# Patient Record
Sex: FEMALE | Race: ASIAN | NOT HISPANIC OR LATINO
[De-identification: names, ages, dates, MRNs, and addresses within clinical notes are randomized per-mention and may not be internally consistent; named-entity substitution may affect disease eponyms.]

---

## 2024-02-12 PROBLEM — Z00.00 ENCOUNTER FOR PREVENTIVE HEALTH EXAMINATION: Status: ACTIVE | Noted: 2024-02-12

## 2024-03-05 ENCOUNTER — ASOB RESULT (OUTPATIENT)
Age: 29
End: 2024-03-05

## 2024-03-05 ENCOUNTER — APPOINTMENT (OUTPATIENT)
Dept: OBGYN | Facility: CLINIC | Age: 29
End: 2024-03-05
Payer: COMMERCIAL

## 2024-03-05 VITALS
HEIGHT: 64 IN | DIASTOLIC BLOOD PRESSURE: 82 MMHG | HEART RATE: 96 BPM | BODY MASS INDEX: 23.05 KG/M2 | SYSTOLIC BLOOD PRESSURE: 125 MMHG | WEIGHT: 135 LBS

## 2024-03-05 DIAGNOSIS — Z78.9 OTHER SPECIFIED HEALTH STATUS: ICD-10-CM

## 2024-03-05 DIAGNOSIS — O36.80X0 PREGNANCY WITH INCONCLUSIVE FETAL VIABILITY, NOT APPLICABLE OR UNSPECIFIED: ICD-10-CM

## 2024-03-05 PROCEDURE — 76816 OB US FOLLOW-UP PER FETUS: CPT

## 2024-03-05 PROCEDURE — 99203 OFFICE O/P NEW LOW 30 MIN: CPT

## 2024-03-05 RX ORDER — PNV NO.95/FERROUS FUM/FOLIC AC 28MG-0.8MG
TABLET ORAL
Refills: 0 | Status: ACTIVE | COMMUNITY

## 2024-03-06 LAB
ABO + RH PNL BLD: NORMAL
ALBUMIN SERPL ELPH-MCNC: 4.2 G/DL
ALP BLD-CCNC: 107 U/L
ALT SERPL-CCNC: 15 U/L
ANION GAP SERPL CALC-SCNC: 13 MMOL/L
AST SERPL-CCNC: 17 U/L
BACTERIA UR CULT: NORMAL
BILIRUB SERPL-MCNC: 0.3 MG/DL
BLD GP AB SCN SERPL QL: NORMAL
BUN SERPL-MCNC: 8 MG/DL
C TRACH RRNA SPEC QL NAA+PROBE: NOT DETECTED
CALCIUM SERPL-MCNC: 9.2 MG/DL
CHLORIDE SERPL-SCNC: 100 MMOL/L
CO2 SERPL-SCNC: 22 MMOL/L
CREAT SERPL-MCNC: 0.66 MG/DL
EGFR: 122 ML/MIN/1.73M2
ESTIMATED AVERAGE GLUCOSE: 108 MG/DL
GLUCOSE SERPL-MCNC: 79 MG/DL
HBA1C MFR BLD HPLC: 5.4 %
HBV SURFACE AG SER QL: NONREACTIVE
HCT VFR BLD CALC: 31 %
HCV AB SER QL: NONREACTIVE
HCV S/CO RATIO: 0.15 S/CO
HGB A MFR BLD: 97.7 %
HGB A2 MFR BLD: 2.3 %
HGB BLD-MCNC: 10 G/DL
HGB FRACT BLD-IMP: NORMAL
HIV1+2 AB SPEC QL IA.RAPID: NONREACTIVE
MCHC RBC-ENTMCNC: 25.5 PG
MCHC RBC-ENTMCNC: 32.3 GM/DL
MCV RBC AUTO: 79.1 FL
MEV IGG FLD QL IA: 14.2 AU/ML
MEV IGG+IGM SER-IMP: NORMAL
N GONORRHOEA RRNA SPEC QL NAA+PROBE: NOT DETECTED
PLATELET # BLD AUTO: 353 K/UL
POTASSIUM SERPL-SCNC: 4.2 MMOL/L
PROT SERPL-MCNC: 7.5 G/DL
RBC # BLD: 3.92 M/UL
RBC # FLD: 15.4 %
RUBV IGG FLD-ACNC: 1.2 INDEX
RUBV IGG SER-IMP: POSITIVE
SODIUM SERPL-SCNC: 135 MMOL/L
SOURCE AMPLIFICATION: NORMAL
T GONDII AB SER-IMP: NEGATIVE
T GONDII AB SER-IMP: NEGATIVE
T GONDII IGG SER QL: <3 IU/ML
T GONDII IGM SER QL: <3 AU/ML
T PALLIDUM AB SER QL IA: NEGATIVE
TSH SERPL-ACNC: 1.82 UIU/ML
VZV AB TITR SER: NORMAL
VZV IGG SER IF-ACNC: 154.9 INDEX
WBC # FLD AUTO: 9.17 K/UL

## 2024-03-08 LAB
B19V IGG SER QL IA: 0.11 INDEX
B19V IGG+IGM SER-IMP: NEGATIVE
B19V IGG+IGM SER-IMP: NORMAL
B19V IGM FLD-ACNC: 0.14 INDEX
B19V IGM SER-ACNC: NEGATIVE
LEAD BLD-MCNC: <1 UG/DL

## 2024-03-10 LAB
AR GENE MUT ANL BLD/T: NORMAL
M TB IFN-G BLD-IMP: NEGATIVE
QUANTIFERON TB PLUS MITOGEN MINUS NIL: 7.69 IU/ML
QUANTIFERON TB PLUS NIL: 0.04 IU/ML
QUANTIFERON TB PLUS TB1 MINUS NIL: 0 IU/ML
QUANTIFERON TB PLUS TB2 MINUS NIL: 0 IU/ML

## 2024-03-13 ENCOUNTER — TRANSCRIPTION ENCOUNTER (OUTPATIENT)
Age: 29
End: 2024-03-13

## 2024-03-13 ENCOUNTER — ASOB RESULT (OUTPATIENT)
Age: 29
End: 2024-03-13

## 2024-03-13 ENCOUNTER — APPOINTMENT (OUTPATIENT)
Dept: ANTEPARTUM | Facility: CLINIC | Age: 29
End: 2024-03-13
Payer: COMMERCIAL

## 2024-03-13 LAB — FMR1 GENE MUT ANL BLD/T: NORMAL

## 2024-03-13 PROCEDURE — 76813 OB US NUCHAL MEAS 1 GEST: CPT | Mod: 59

## 2024-03-13 PROCEDURE — 76801 OB US < 14 WKS SINGLE FETUS: CPT

## 2024-03-18 LAB — CFTR MUT TESTED BLD/T: NEGATIVE

## 2024-04-02 ENCOUNTER — APPOINTMENT (OUTPATIENT)
Dept: OBGYN | Facility: CLINIC | Age: 29
End: 2024-04-02
Payer: COMMERCIAL

## 2024-04-02 ENCOUNTER — NON-APPOINTMENT (OUTPATIENT)
Age: 29
End: 2024-04-02

## 2024-04-02 VITALS
SYSTOLIC BLOOD PRESSURE: 109 MMHG | DIASTOLIC BLOOD PRESSURE: 64 MMHG | WEIGHT: 138 LBS | BODY MASS INDEX: 23.56 KG/M2 | HEIGHT: 64 IN | HEART RATE: 80 BPM

## 2024-04-02 PROCEDURE — 0502F SUBSEQUENT PRENATAL CARE: CPT

## 2024-04-05 LAB
AFP MOM: 1.02
AFP VALUE: 39.3 NG/ML
ALPHA FETOPROTEIN SERUM COMMENT: NORMAL
ALPHA FETOPROTEIN SERUM INTERPRETATION: NORMAL
ALPHA FETOPROTEIN SERUM RESULTS: NORMAL
ALPHA FETOPROTEIN SERUM TEST RESULTS: NORMAL
GESTATIONAL AGE BASED ON: NORMAL
GESTATIONAL AGE ON COLLECTION DATE: 16.6 WEEKS
INSULIN DEP DIABETES: NO
MATERNAL AGE AT EDD AFP: 30.2 YR
MULTIPLE GESTATION: NO
OSBR RISK 1 IN: NORMAL
RACE: NORMAL
WEIGHT AFP: 138 LBS

## 2024-04-24 ENCOUNTER — APPOINTMENT (OUTPATIENT)
Dept: ANTEPARTUM | Facility: CLINIC | Age: 29
End: 2024-04-24
Payer: COMMERCIAL

## 2024-04-24 ENCOUNTER — APPOINTMENT (OUTPATIENT)
Dept: ANTEPARTUM | Facility: CLINIC | Age: 29
End: 2024-04-24

## 2024-04-24 ENCOUNTER — ASOB RESULT (OUTPATIENT)
Age: 29
End: 2024-04-24

## 2024-04-24 PROCEDURE — 76805 OB US >/= 14 WKS SNGL FETUS: CPT

## 2024-05-03 ENCOUNTER — APPOINTMENT (OUTPATIENT)
Dept: OBGYN | Facility: CLINIC | Age: 29
End: 2024-05-03
Payer: COMMERCIAL

## 2024-05-03 ENCOUNTER — NON-APPOINTMENT (OUTPATIENT)
Age: 29
End: 2024-05-03

## 2024-05-03 DIAGNOSIS — Z34.92 ENCOUNTER FOR SUPERVISION OF NORMAL PREGNANCY, UNSPECIFIED, SECOND TRIMESTER: ICD-10-CM

## 2024-05-03 PROCEDURE — 0502F SUBSEQUENT PRENATAL CARE: CPT

## 2024-05-05 PROBLEM — Z34.92 ENCOUNTER FOR SUPERVISION OF LOW-RISK PREGNANCY IN SECOND TRIMESTER: Status: ACTIVE | Noted: 2024-04-02 | Resolved: 2024-11-28

## 2024-05-31 ENCOUNTER — APPOINTMENT (OUTPATIENT)
Dept: OBGYN | Facility: CLINIC | Age: 29
End: 2024-05-31

## 2024-06-04 ENCOUNTER — APPOINTMENT (OUTPATIENT)
Dept: OBGYN | Facility: CLINIC | Age: 29
End: 2024-06-04
Payer: COMMERCIAL

## 2024-06-04 VITALS
HEIGHT: 64 IN | SYSTOLIC BLOOD PRESSURE: 104 MMHG | WEIGHT: 145 LBS | BODY MASS INDEX: 24.75 KG/M2 | HEART RATE: 91 BPM | DIASTOLIC BLOOD PRESSURE: 68 MMHG

## 2024-06-04 DIAGNOSIS — Z34.82 ENCOUNTER FOR SUPERVISION OF OTHER NORMAL PREGNANCY, SECOND TRIMESTER: ICD-10-CM

## 2024-06-04 PROCEDURE — 0502F SUBSEQUENT PRENATAL CARE: CPT

## 2024-06-05 LAB
GLUCOSE 1H P 100 G GLC PO SERPL-MCNC: 88 MG/DL
HCT VFR BLD CALC: 30.5 %
HGB BLD-MCNC: 9.2 G/DL
MCHC RBC-ENTMCNC: 25.1 PG
MCHC RBC-ENTMCNC: 30.2 GM/DL
MCV RBC AUTO: 83.1 FL
PLATELET # BLD AUTO: 315 K/UL
RBC # BLD: 3.67 M/UL
RBC # FLD: 14.6 %
WBC # FLD AUTO: 11.6 K/UL

## 2024-06-28 ENCOUNTER — NON-APPOINTMENT (OUTPATIENT)
Age: 29
End: 2024-06-28

## 2024-06-28 ENCOUNTER — APPOINTMENT (OUTPATIENT)
Dept: OBGYN | Facility: CLINIC | Age: 29
End: 2024-06-28
Payer: COMMERCIAL

## 2024-06-28 VITALS
BODY MASS INDEX: 25.44 KG/M2 | HEART RATE: 85 BPM | SYSTOLIC BLOOD PRESSURE: 102 MMHG | HEIGHT: 64 IN | DIASTOLIC BLOOD PRESSURE: 67 MMHG | WEIGHT: 149 LBS

## 2024-06-28 DIAGNOSIS — Z98.891 HISTORY OF UTERINE SCAR FROM PREVIOUS SURGERY: ICD-10-CM

## 2024-06-28 DIAGNOSIS — Z34.83 ENCOUNTER FOR SUPERVISION OF OTHER NORMAL PREGNANCY, THIRD TRIMESTER: ICD-10-CM

## 2024-06-28 PROCEDURE — 0502F SUBSEQUENT PRENATAL CARE: CPT

## 2024-07-01 LAB — T PALLIDUM AB SER QL IA: NEGATIVE

## 2024-08-01 ENCOUNTER — APPOINTMENT (OUTPATIENT)
Dept: ANTEPARTUM | Facility: CLINIC | Age: 29
End: 2024-08-01
Payer: COMMERCIAL

## 2024-08-01 ENCOUNTER — ASOB RESULT (OUTPATIENT)
Age: 29
End: 2024-08-01

## 2024-08-01 PROCEDURE — 76819 FETAL BIOPHYS PROFIL W/O NST: CPT | Mod: 59

## 2024-08-01 PROCEDURE — 76816 OB US FOLLOW-UP PER FETUS: CPT

## 2024-08-05 ENCOUNTER — NON-APPOINTMENT (OUTPATIENT)
Age: 29
End: 2024-08-05

## 2024-08-05 ENCOUNTER — APPOINTMENT (OUTPATIENT)
Dept: OBGYN | Facility: CLINIC | Age: 29
End: 2024-08-05

## 2024-08-05 PROCEDURE — 0502F SUBSEQUENT PRENATAL CARE: CPT

## 2024-08-19 ENCOUNTER — NON-APPOINTMENT (OUTPATIENT)
Age: 29
End: 2024-08-19

## 2024-08-19 ENCOUNTER — APPOINTMENT (OUTPATIENT)
Dept: OBGYN | Facility: CLINIC | Age: 29
End: 2024-08-19
Payer: COMMERCIAL

## 2024-08-19 VITALS
HEART RATE: 85 BPM | HEIGHT: 64 IN | WEIGHT: 155 LBS | SYSTOLIC BLOOD PRESSURE: 111 MMHG | BODY MASS INDEX: 26.46 KG/M2 | DIASTOLIC BLOOD PRESSURE: 65 MMHG

## 2024-08-19 PROCEDURE — 0502F SUBSEQUENT PRENATAL CARE: CPT

## 2024-08-21 LAB
GP B STREP DNA SPEC QL NAA+PROBE: NOT DETECTED
SOURCE GBS: NORMAL

## 2024-08-30 ENCOUNTER — APPOINTMENT (OUTPATIENT)
Dept: OBGYN | Facility: CLINIC | Age: 29
End: 2024-08-30
Payer: COMMERCIAL

## 2024-08-30 ENCOUNTER — NON-APPOINTMENT (OUTPATIENT)
Age: 29
End: 2024-08-30

## 2024-08-30 VITALS
HEART RATE: 71 BPM | DIASTOLIC BLOOD PRESSURE: 69 MMHG | WEIGHT: 158 LBS | HEIGHT: 64 IN | BODY MASS INDEX: 26.98 KG/M2 | SYSTOLIC BLOOD PRESSURE: 106 MMHG

## 2024-08-30 PROCEDURE — 0502F SUBSEQUENT PRENATAL CARE: CPT

## 2024-09-06 ENCOUNTER — APPOINTMENT (OUTPATIENT)
Dept: OBGYN | Facility: CLINIC | Age: 29
End: 2024-09-06
Payer: COMMERCIAL

## 2024-09-06 VITALS
DIASTOLIC BLOOD PRESSURE: 68 MMHG | HEIGHT: 64 IN | BODY MASS INDEX: 27.14 KG/M2 | SYSTOLIC BLOOD PRESSURE: 108 MMHG | HEART RATE: 73 BPM | WEIGHT: 159 LBS

## 2024-09-06 DIAGNOSIS — Z98.891 HISTORY OF UTERINE SCAR FROM PREVIOUS SURGERY: ICD-10-CM

## 2024-09-06 DIAGNOSIS — Z34.83 ENCOUNTER FOR SUPERVISION OF OTHER NORMAL PREGNANCY, THIRD TRIMESTER: ICD-10-CM

## 2024-09-06 PROCEDURE — 0502F SUBSEQUENT PRENATAL CARE: CPT

## 2024-09-12 ENCOUNTER — ASOB RESULT (OUTPATIENT)
Age: 29
End: 2024-09-12

## 2024-09-12 ENCOUNTER — APPOINTMENT (OUTPATIENT)
Dept: ANTEPARTUM | Facility: CLINIC | Age: 29
End: 2024-09-12
Payer: COMMERCIAL

## 2024-09-12 ENCOUNTER — INPATIENT (INPATIENT)
Facility: HOSPITAL | Age: 29
LOS: 0 days | Discharge: HOME | End: 2024-09-13
Attending: STUDENT IN AN ORGANIZED HEALTH CARE EDUCATION/TRAINING PROGRAM | Admitting: STUDENT IN AN ORGANIZED HEALTH CARE EDUCATION/TRAINING PROGRAM
Payer: COMMERCIAL

## 2024-09-12 ENCOUNTER — TRANSCRIPTION ENCOUNTER (OUTPATIENT)
Age: 29
End: 2024-09-12

## 2024-09-12 VITALS — TEMPERATURE: 98 F | RESPIRATION RATE: 15 BRPM

## 2024-09-12 DIAGNOSIS — Z98.891 HISTORY OF UTERINE SCAR FROM PREVIOUS SURGERY: Chronic | ICD-10-CM

## 2024-09-12 DIAGNOSIS — O26.899 OTHER SPECIFIED PREGNANCY RELATED CONDITIONS, UNSPECIFIED TRIMESTER: ICD-10-CM

## 2024-09-12 DIAGNOSIS — O42.10 PREMATURE RUPTURE OF MEMBRANES, ONSET OF LABOR MORE THAN 24 HOURS FOLLOWING RUPTURE, UNSPECIFIED WEEKS OF GESTATION: ICD-10-CM

## 2024-09-12 DIAGNOSIS — O42.90 PREMATURE RUPTURE OF MEMBRANES, UNSPECIFIED AS TO LENGTH OF TIME BETWEEN RUPTURE AND ONSET OF LABOR, UNSPECIFIED WEEKS OF GESTATION: ICD-10-CM

## 2024-09-12 LAB
BASOPHILS # BLD AUTO: 0.04 K/UL — SIGNIFICANT CHANGE UP (ref 0–0.2)
BASOPHILS NFR BLD AUTO: 0.3 % — SIGNIFICANT CHANGE UP (ref 0–2)
BLD GP AB SCN SERPL QL: NEGATIVE — SIGNIFICANT CHANGE UP
EOSINOPHIL # BLD AUTO: 0.16 K/UL — SIGNIFICANT CHANGE UP (ref 0–0.5)
EOSINOPHIL NFR BLD AUTO: 1.2 % — SIGNIFICANT CHANGE UP (ref 0–6)
HCT VFR BLD CALC: 33.4 % — LOW (ref 34.5–45)
HGB BLD-MCNC: 10.6 G/DL — LOW (ref 11.5–15.5)
IANC: 10.49 K/UL — HIGH (ref 1.8–7.4)
IMM GRANULOCYTES NFR BLD AUTO: 0.8 % — SIGNIFICANT CHANGE UP (ref 0–0.9)
LYMPHOCYTES # BLD AUTO: 15.7 % — SIGNIFICANT CHANGE UP (ref 13–44)
LYMPHOCYTES # BLD AUTO: 2.17 K/UL — SIGNIFICANT CHANGE UP (ref 1–3.3)
MCHC RBC-ENTMCNC: 25.1 PG — LOW (ref 27–34)
MCHC RBC-ENTMCNC: 31.7 GM/DL — LOW (ref 32–36)
MCV RBC AUTO: 79.1 FL — LOW (ref 80–100)
MONOCYTES # BLD AUTO: 0.83 K/UL — SIGNIFICANT CHANGE UP (ref 0–0.9)
MONOCYTES NFR BLD AUTO: 6 % — SIGNIFICANT CHANGE UP (ref 2–14)
NEUTROPHILS # BLD AUTO: 10.49 K/UL — HIGH (ref 1.8–7.4)
NEUTROPHILS NFR BLD AUTO: 76 % — SIGNIFICANT CHANGE UP (ref 43–77)
NRBC # BLD: 0 /100 WBCS — SIGNIFICANT CHANGE UP (ref 0–0)
NRBC # FLD: 0 K/UL — SIGNIFICANT CHANGE UP (ref 0–0)
PLATELET # BLD AUTO: 309 K/UL — SIGNIFICANT CHANGE UP (ref 150–400)
RBC # BLD: 4.22 M/UL — SIGNIFICANT CHANGE UP (ref 3.8–5.2)
RBC # FLD: 14.9 % — HIGH (ref 10.3–14.5)
RH IG SCN BLD-IMP: POSITIVE — SIGNIFICANT CHANGE UP
RH IG SCN BLD-IMP: POSITIVE — SIGNIFICANT CHANGE UP
T PALLIDUM AB TITR SER: NEGATIVE — SIGNIFICANT CHANGE UP
WBC # BLD: 13.8 K/UL — HIGH (ref 3.8–10.5)
WBC # FLD AUTO: 13.8 K/UL — HIGH (ref 3.8–10.5)

## 2024-09-12 PROCEDURE — 76815 OB US LIMITED FETUS(S): CPT | Mod: 26

## 2024-09-12 PROCEDURE — 59410 OBSTETRICAL CARE: CPT | Mod: U9,GC

## 2024-09-12 RX ORDER — HYDROCORTISONE 1 %
1 OINTMENT (GRAM) TOPICAL EVERY 6 HOURS
Refills: 0 | Status: DISCONTINUED | OUTPATIENT
Start: 2024-09-12 | End: 2024-09-13

## 2024-09-12 RX ORDER — WITCH HAZEL 1 G/ML
1 LIQUID TOPICAL EVERY 4 HOURS
Refills: 0 | Status: DISCONTINUED | OUTPATIENT
Start: 2024-09-12 | End: 2024-09-13

## 2024-09-12 RX ORDER — CHLORHEXIDINE GLUCONATE 40 MG/ML
1 SOLUTION TOPICAL DAILY
Refills: 0 | Status: DISCONTINUED | OUTPATIENT
Start: 2024-09-12 | End: 2024-09-12

## 2024-09-12 RX ORDER — IBUPROFEN 600 MG
1 TABLET ORAL
Qty: 0 | Refills: 0 | DISCHARGE
Start: 2024-09-12

## 2024-09-12 RX ORDER — FLU VACCINE TS 2012-2013(5YR+) 45MCG/.5ML
0.5 VIAL (ML) INTRAMUSCULAR ONCE
Refills: 0 | Status: DISCONTINUED | OUTPATIENT
Start: 2024-09-12 | End: 2024-09-13

## 2024-09-12 RX ORDER — KETOROLAC TROMETHAMINE 30 MG/ML
30 INJECTION, SOLUTION INTRAMUSCULAR ONCE
Refills: 0 | Status: DISCONTINUED | OUTPATIENT
Start: 2024-09-12 | End: 2024-09-13

## 2024-09-12 RX ORDER — OXYTOCIN 10 UNIT/ML
2 AMPUL (ML) INJECTION
Qty: 30 | Refills: 0 | Status: DISCONTINUED | OUTPATIENT
Start: 2024-09-12 | End: 2024-09-12

## 2024-09-12 RX ORDER — SODIUM CHLORIDE 9 MG/ML
1000 INJECTION INTRAMUSCULAR; INTRAVENOUS; SUBCUTANEOUS
Refills: 0 | Status: DISCONTINUED | OUTPATIENT
Start: 2024-09-12 | End: 2024-09-13

## 2024-09-12 RX ORDER — FERROUS SULFATE 325(65) MG
325 TABLET ORAL
Refills: 0 | DISCHARGE

## 2024-09-12 RX ORDER — PRAMOXINE HCL 1 %
1 GEL (GRAM) TOPICAL EVERY 4 HOURS
Refills: 0 | Status: DISCONTINUED | OUTPATIENT
Start: 2024-09-12 | End: 2024-09-13

## 2024-09-12 RX ORDER — IBUPROFEN 600 MG
600 TABLET ORAL EVERY 6 HOURS
Refills: 0 | Status: DISCONTINUED | OUTPATIENT
Start: 2024-09-12 | End: 2024-09-13

## 2024-09-12 RX ORDER — OXYTOCIN 10 UNIT/ML
167 AMPUL (ML) INJECTION
Qty: 30 | Refills: 0 | Status: DISCONTINUED | OUTPATIENT
Start: 2024-09-12 | End: 2024-09-13

## 2024-09-12 RX ORDER — SODIUM CHLORIDE 9 MG/ML
3 INJECTION INTRAMUSCULAR; INTRAVENOUS; SUBCUTANEOUS EVERY 8 HOURS
Refills: 0 | Status: DISCONTINUED | OUTPATIENT
Start: 2024-09-12 | End: 2024-09-13

## 2024-09-12 RX ORDER — OXYCODONE HYDROCHLORIDE 5 MG/1
5 TABLET ORAL
Refills: 0 | Status: DISCONTINUED | OUTPATIENT
Start: 2024-09-12 | End: 2024-09-13

## 2024-09-12 RX ORDER — TETANUS TOXOID, REDUCED DIPHTHERIA TOXOID AND ACELLULAR PERTUSSIS VACCINE, ADSORBED 5; 2.5; 8; 8; 2.5 [IU]/.5ML; [IU]/.5ML; UG/.5ML; UG/.5ML; UG/.5ML
0.5 SUSPENSION INTRAMUSCULAR ONCE
Refills: 0 | Status: DISCONTINUED | OUTPATIENT
Start: 2024-09-12 | End: 2024-09-13

## 2024-09-12 RX ORDER — ACETAMINOPHEN 325 MG/1
3 TABLET ORAL
Qty: 0 | Refills: 0 | DISCHARGE
Start: 2024-09-12

## 2024-09-12 RX ORDER — IBUPROFEN 600 MG
600 TABLET ORAL EVERY 6 HOURS
Refills: 0 | Status: COMPLETED | OUTPATIENT
Start: 2024-09-12 | End: 2025-08-11

## 2024-09-12 RX ORDER — SODIUM CHLORIDE 9 MG/ML
250 INJECTION INTRAMUSCULAR; INTRAVENOUS; SUBCUTANEOUS ONCE
Refills: 0 | Status: DISCONTINUED | OUTPATIENT
Start: 2024-09-12 | End: 2024-09-13

## 2024-09-12 RX ORDER — MENTHOL/CETYLPYRD CL 3 MG
1 LOZENGE MUCOUS MEMBRANE EVERY 6 HOURS
Refills: 0 | Status: DISCONTINUED | OUTPATIENT
Start: 2024-09-12 | End: 2024-09-13

## 2024-09-12 RX ORDER — DIPHENHYDRAMINE HCL 50 MG
25 CAPSULE ORAL EVERY 6 HOURS
Refills: 0 | Status: DISCONTINUED | OUTPATIENT
Start: 2024-09-12 | End: 2024-09-13

## 2024-09-12 RX ORDER — OXYCODONE HYDROCHLORIDE 5 MG/1
5 TABLET ORAL ONCE
Refills: 0 | Status: DISCONTINUED | OUTPATIENT
Start: 2024-09-12 | End: 2024-09-13

## 2024-09-12 RX ORDER — VITAMIN A, ASCORBIC ACID, VITAMIN D, .ALPHA.-TOCOPHEROL, THIAMINE MONONITRATE, RIBOFLAVIN, NIACIN, PYRIDOXINE HYDROCHLORIDE, FOLIC ACID, CYANOCOBALAMIN, CALCIUM, IRON, MAGNESIUM, ZINC, AND COPPER 2700; 70; 400; 30; 1.6; 1.8; 18; 2.5; 1; 12; 100; 65; 25; 25; 2 [IU]/1; MG/1; [IU]/1; [IU]/1; MG/1; MG/1; MG/1; MG/1; MG/1; UG/1; MG/1; MG/1; MG/1; MG/1; MG/1
0 TABLET ORAL
Refills: 0 | DISCHARGE

## 2024-09-12 RX ORDER — OXYTOCIN 10 UNIT/ML
167 AMPUL (ML) INJECTION
Qty: 30 | Refills: 0 | Status: DISCONTINUED | OUTPATIENT
Start: 2024-09-12 | End: 2024-09-12

## 2024-09-12 RX ORDER — ACETAMINOPHEN 325 MG/1
975 TABLET ORAL
Refills: 0 | Status: DISCONTINUED | OUTPATIENT
Start: 2024-09-12 | End: 2024-09-13

## 2024-09-12 RX ORDER — VITAMIN A, ASCORBIC ACID, VITAMIN D, .ALPHA.-TOCOPHEROL, THIAMINE MONONITRATE, RIBOFLAVIN, NIACIN, PYRIDOXINE HYDROCHLORIDE, FOLIC ACID, CYANOCOBALAMIN, CALCIUM, IRON, MAGNESIUM, ZINC, AND COPPER 2700; 70; 400; 30; 1.6; 1.8; 18; 2.5; 1; 12; 100; 65; 25; 25; 2 [IU]/1; MG/1; [IU]/1; [IU]/1; MG/1; MG/1; MG/1; MG/1; MG/1; UG/1; MG/1; MG/1; MG/1; MG/1; MG/1
1 TABLET ORAL DAILY
Refills: 0 | Status: DISCONTINUED | OUTPATIENT
Start: 2024-09-12 | End: 2024-09-13

## 2024-09-12 RX ORDER — OXYTOCIN 10 UNIT/ML
666 AMPUL (ML) INJECTION
Qty: 30 | Refills: 0 | Status: DISCONTINUED | OUTPATIENT
Start: 2024-09-12 | End: 2024-09-13

## 2024-09-12 RX ORDER — OXYTOCIN 10 UNIT/ML
1 AMPUL (ML) INJECTION
Qty: 30 | Refills: 0 | Status: DISCONTINUED | OUTPATIENT
Start: 2024-09-12 | End: 2024-09-12

## 2024-09-12 RX ORDER — LANOLIN
1 OINTMENT (GRAM) TOPICAL EVERY 6 HOURS
Refills: 0 | Status: DISCONTINUED | OUTPATIENT
Start: 2024-09-12 | End: 2024-09-13

## 2024-09-12 RX ADMIN — Medication 1 APPLICATION(S): at 22:38

## 2024-09-12 RX ADMIN — Medication 1 MILLIUNIT(S)/MIN: at 10:12

## 2024-09-12 RX ADMIN — ACETAMINOPHEN 975 MILLIGRAM(S): 325 TABLET ORAL at 23:32

## 2024-09-12 RX ADMIN — Medication 125 MILLILITER(S): at 12:50

## 2024-09-12 RX ADMIN — Medication 1 MILLIUNIT(S)/MIN: at 12:48

## 2024-09-12 RX ADMIN — ACETAMINOPHEN 975 MILLIGRAM(S): 325 TABLET ORAL at 22:37

## 2024-09-12 RX ADMIN — Medication 125 MILLILITER(S): at 09:04

## 2024-09-12 RX ADMIN — CHLORHEXIDINE GLUCONATE 1 APPLICATION(S): 40 SOLUTION TOPICAL at 12:49

## 2024-09-12 RX ADMIN — WITCH HAZEL 1 APPLICATION(S): 1 LIQUID TOPICAL at 22:37

## 2024-09-12 RX ADMIN — SODIUM CHLORIDE 3 MILLILITER(S): 9 INJECTION INTRAMUSCULAR; INTRAVENOUS; SUBCUTANEOUS at 22:00

## 2024-09-12 NOTE — OB PROVIDER TRIAGE NOTE - NSOBPROVIDERNOTE_OBGYN_ALL_OB_FT
patient is a 28 y/o EDC 2024 EGA 39   admit for rupture of membrane  - Discussed with   - Admit to Labor and Delivery  - Continuous EFM  - Clear diet, IV fluids  - Consent signed  - Standard labs (T&S, CBC, RPR)  - Epidural PRN  - Induction/augmentation agent: Pitocin    Risks, benefits, alternatives, and possible complications have been discussed in detail with the patient in her native language. All questions answered, all appropriate hospital consents were signed.   Informed consent was obtained.

## 2024-09-12 NOTE — OB RN TRIAGE NOTE - FALL HARM RISK - UNIVERSAL INTERVENTIONS
Patient refused pregnancy test. She stated that she \"never done it\". PA aware.   Bed in lowest position, wheels locked, appropriate side rails in place/Call bell, personal items and telephone in reach/Instruct patient to call for assistance before getting out of bed or chair/Non-slip footwear when patient is out of bed/[place] to call system/Physically safe environment - no spills, clutter or unnecessary equipment/Purposeful Proactive Rounding/Room/bathroom lighting operational, light cord in reach

## 2024-09-12 NOTE — DISCHARGE NOTE OB - HOSPITAL COURSE
successful . cat II FHR  single live male infant  Upon discharge, patient is spontaneously voiding, tolerating a regular diet, out of bed, and reports adequate pain control

## 2024-09-12 NOTE — OB PROVIDER LABOR PROGRESS NOTE - ASSESSMENT
28y/o  @39.6wks gestation admit for TOLAC and SROM    -Maternal position change provided   -Continue EFM/[place]   -Expectant management     SCOTTY Kat NP
28y/o  @39.6wks gestation admit for TOLAC and SROM     -Patient requesting epidural, Anesthesia has been notified   -Pitocin stopped due to tachysystole   -Continue EFM/TOCO     D/W Dr. Niharika Kat, NP

## 2024-09-12 NOTE — OB RN DELIVERY SUMMARY - NSSELHIDDEN_OBGYN_ALL_OB_FT
[NS_DeliveryAttending1_OBGYN_ALL_OB_FT:[id number]==],[NS_DeliveryRN_OBGYN_ALL_OB_FT:[id number]==],[NS_CirculateRN2_OBGYN_ALL_OB_FT:[id number]==]

## 2024-09-12 NOTE — DISCHARGE NOTE OB - CARE PROVIDER_API CALL
Mau Bone  Obstetrics and Gynecology  [address], [address]  [place] [address]  Phone: [phone]  Fax: [phone]  Follow Up Time:

## 2024-09-12 NOTE — OB PROVIDER H&P - HISTORY OF PRESENT ILLNESS
patient is a 30 y/o EDC 2024 EGA 39 6/  reports of intermittent contractions and large amount water continuously draining. Patient reports of pain 4 out 10 on numeric pain, scale, does not want pain management at this time. Patient confirms signing TOLAC.     Antepartum History:  - GBS status: negative 2024   - 2024 ATU: cephalic, posterior placenta, no previa, ROSA M 11.53, EFW  BPP

## 2024-09-12 NOTE — OB PROVIDER LABOR PROGRESS NOTE - NS_SUBJECTIVE/OBJECTIVE_OBGYN_ALL_OB_FT
Patient seen and evaluated for labor progress.  Patient verbalizes increase in contraction pain.      Vital Signs Last 24 Hrs  T(C): 37.1 (12 Sep 2024 10:21), Max: 37.1 (12 Sep 2024 10:21)  T(F): 98.78 (12 Sep 2024 10:21), Max: 98.78 (12 Sep 2024 10:21)  HR: 78 (12 Sep 2024 11:23) (64 - 95)  BP: 125/63 (12 Sep 2024 11:23) (99/56 - 125/64)  RR: 16 (12 Sep 2024 10:21) (15 - 17)  SpO2: 99% (12 Sep 2024 11:22) (98% - 100%)
Patient seen and evaluated for labor progress.  Patient verbalizes occasional vaginal pressure.  Epidural in place and effective     Vital Signs Last 24 Hrs  T(C): 36.8 (12 Sep 2024 14:24), Max: 37.1 (12 Sep 2024 10:21)  T(F): 98.24 (12 Sep 2024 14:24), Max: 98.78 (12 Sep 2024 10:21)  HR: 86 (12 Sep 2024 16:07) (56 - 95)  BP: 115/56 (12 Sep 2024 15:55) (90/52 - 125/64)  RR: 17 (12 Sep 2024 14:24) (15 - 17)  SpO2: 100% (12 Sep 2024 16:07) (96% - 100%)

## 2024-09-12 NOTE — DISCHARGE NOTE OB - PATIENT PORTAL LINK FT
You can access the FollowMyHealth Patient Portal offered by [facility] by registering at the following website: [email or web address]. By joining [facility]’s FollowMyHealth portal, you will also be able to view your health information using other applications (apps) compatible with our system.

## 2024-09-12 NOTE — DISCHARGE NOTE OB - NS MD DC FALL RISK RISK
For information on Fall & Injury Prevention, visit: [email or web address] OR  [email or web address] OR  https://www.cdc.gov/steadi/patient.html

## 2024-09-12 NOTE — OB PROVIDER TRIAGE NOTE - NSHPPHYSICALEXAM_GEN_ALL_CORE
T(C): 36.8 (09-12-24 @ 07:09), Max: 36.8 (09-12-24 @ 07:07)  HR: 90 (09-12-24 @ 07:09) (90 - 90)  BP: 105/51 (09-12-24 @ 07:09) (105/51 - 105/51)  RR: 16 (09-12-24 @ 07:09) (15 - 16)    General: Female sitting comfortably   Head: Normocephalic. Atraumatic.   Eyes: No discharge, lids normal, conjunctiva normal  Lungs: No respiratory distress  Abdomen: Soft, nontender. Gravid. Mild contractions on palpation  TAUS:  Sono saved in ASOB. cephalic, posterior placenta, m-mode 149, MVP 2.34  NST: 145 baseline with moderate variability, accelerations present, loss of contact present, reactive  CTX: irregular   Neuro: No facial asymmetry, no slurred speech, moves all 4 extremities  Mood: Alert and lucid, appropriate mood and affect  SVE: clear fluid, large amount present, nitrazine positive, fern positive 2.5/50/-3

## 2024-09-12 NOTE — DISCHARGE NOTE OB - MATERIALS PROVIDED
[facility] Screening Program/  Immunization Record/Breastfeeding Log/Breastfeeding Mother’s Support Group Information/Guide to Postpartum Care/[facility] Hearing Screen Program/Back To Sleep Handout/Shaken Baby Prevention Handout/Breastfeeding Guide and Packet/Birth Certificate Instructions/Discharge Medication Information for Patients and Families Pocket Guide/Tdap Vaccination (VIS Pub Date: 2012)

## 2024-09-12 NOTE — OB PROVIDER DELIVERY SUMMARY - AS DELIV COMPLICATIONS OB
Detail Level: Detailed Depth Of Biopsy: dermis Was A Bandage Applied: Yes Size Of Lesion In Cm: 0 X Size Of Lesion In Cm: 0.4 Biopsy Type: H and E Biopsy Method: Dermablade Anesthesia Type: 1% lidocaine with epinephrine Anesthesia Volume In Cc (Will Not Render If 0): 1.5 Hemostasis: Drysol Wound Care: Petrolatum Dressing: bandage Type Of Destruction Used: Electrodesiccation Curettage Text: The wound bed was treated with curettage after the biopsy was performed. Cryotherapy Text: The wound bed was treated with cryotherapy after the biopsy was performed. Electrodesiccation Text: The wound bed was treated with electrodesiccation after the biopsy was performed. Electrodesiccation And Curettage Text: The wound bed was treated with electrodesiccation and curettage after the biopsy was performed. Silver Nitrate Text: The wound bed was treated with silver nitrate after the biopsy was performed. Lab: 6 Lab Facility: 3 [facility] Notes In Note?: No Consent: Written consent was obtained and risks were reviewed including but not limited to scarring, infection, bleeding, scabbing, incomplete removal, nerve damage and allergy to anesthesia. Post-Care Instructions: I reviewed with the patient in detail post-care instructions. Patient is to keep the biopsy site dry overnight, and then apply bacitracin twice daily until healed. Patient may apply hydrogen peroxide soaks to remove any crusting. Notification Instructions: Patient will be notified of biopsy results. However, patient instructed to call the office if not contacted within 2 weeks. Billing Type: Third-Party Bill Information: Selecting Yes will display possible errors in your note based on the variables you have selected. This validation is only offered as a suggestion for you. PLEASE NOTE THAT THE VALIDATION TEXT WILL BE REMOVED WHEN YOU FINALIZE YOUR NOTE. IF YOU WANT TO FAX A PRELIMINARY NOTE YOU WILL NEED TO TOGGLE THIS TO 'NO' IF YOU DO NOT WANT IT IN YOUR FAXED NOTE. abnormal fetal heart rate tracing

## 2024-09-12 NOTE — OB PROVIDER TRIAGE NOTE - HISTORY OF PRESENT ILLNESS
patient is a 28 y/o EDC 2024 EGA 39 6/  reports of intermittent contractions and large amount water continuously draining. Patient reports of pain 4 out 10 on numeric pain, scale, does not want pain management at this time. Patient confirms signing TOLAC.     Antepartum History:  - GBS status: negative 2024   - 2024 ATU: cephalic, posterior placenta, no previa, ROSA M 11.53, EFW  BPP

## 2024-09-12 NOTE — OB RN DELIVERY SUMMARY - NS_SEPSISRSKCALC_OBGYN_ALL_OB_FT
EOS calculated successfully. EOS Risk Factor: 0.5/1000 live births ([facility] national incidence); GA=39w6d; Temp=98.78; ROM=15.717; GBS='Negative'; Antibiotics='No antibiotics or any antibiotics < 2 hrs prior to birth'

## 2024-09-12 NOTE — OB PROVIDER TRIAGE NOTE - NS_FETALMOVEMENT_OBGYN_ALL_OB
You can access the FollowMyHealth Patient Portal offered by [facility] by registering at the following website: [email or web address]. By joining [facility]’s FollowMyHealth portal, you will also be able to view your health information using other applications (apps) compatible with our system. Present, unchanged

## 2024-09-12 NOTE — OB PROVIDER H&P - ASSESSMENT
patient is a 30 y/o EDC 2024 EGA 39   admit for rupture of membrane  - Discussed with   - Admit to Labor and Delivery  - Continuous EFM  - Clear diet, IV fluids  - Consent signed  - Standard labs (T&S, CBC, RPR)  - Epidural PRN  - Induction/augmentation agent: Pitocin    Risks, benefits, alternatives, and possible complications have been discussed in detail with the patient in her native language. All questions answered, all appropriate hospital consents were signed.   Informed consent was obtained.

## 2024-09-12 NOTE — OB PROVIDER LABOR PROGRESS NOTE - NS_OBIHIFHRDETAILS_OBGYN_ALL_OB_FT
130bpm/moderate variability/+accels, early decels
140bpm/moderate variability/+accels, early decelerations

## 2024-09-12 NOTE — OB NEONATOLOGY/PEDIATRICIAN DELIVERY SUMMARY - NSPEDSNEONOTESA_OBGYN_ALL_OB_FT
Peds called to LDR for NRFHR. Baby is a 39.6 wk SGA male born via  to a 28 y/o  mother.  No significant maternal or prenatal history. Maternal labs include Blood Type O+ , HIV - , RPR NR , Rubella I , Hep B - , GBS - on . ROM with clear fluids (ROM hours: approx 16 hrs). Highest maternal temp: 98.7 F. EOS 0.19 . Baby emerged vigorous, crying, was warmed, dried, suctioned, and stimulated with APGARS of 9/9. Resuscitation included: bulb suction and stimulation. Mom plans to initiate breastfeeding, consents Hep B vaccine andrequests circ.    : 2024  TOB: 18:33  BW: 2620 g     Physical Exam (Post-Delivery)  Gen: NAD; well-appearing  HEENT: NC/AT; anterior fontanelle open and flat; ears and nose clinically patent, normally set; no tags, no cleft palate appreciated  Skin: pink, warm, well-perfused, no rash  Resp: non-labored breathing  Abd: soft, NT/ND; no masses appreciated, umbilical cord with 3 vessels  Extremities: moving all extremities  MSK: no clavicular fracture appreciated  : Woo I; no abnormalities; anus patent  Back:  (+) sacral dimple w/ visualized base  Neuro: +kelly, +babinski, grasp, good tone throughout

## 2024-09-12 NOTE — DISCHARGE NOTE OB - CARE PLAN
1 Principal Discharge DX:	BERTA, delivered  Assessment and plan of treatment:	diet and activity as tolerated

## 2024-09-13 ENCOUNTER — APPOINTMENT (OUTPATIENT)
Dept: OBGYN | Facility: CLINIC | Age: 29
End: 2024-09-13

## 2024-09-13 VITALS — HEIGHT: 64 IN

## 2024-09-13 RX ADMIN — ACETAMINOPHEN 975 MILLIGRAM(S): 325 TABLET ORAL at 21:34

## 2024-09-13 RX ADMIN — Medication 600 MILLIGRAM(S): at 17:48

## 2024-09-13 RX ADMIN — ACETAMINOPHEN 975 MILLIGRAM(S): 325 TABLET ORAL at 14:55

## 2024-09-13 RX ADMIN — Medication 600 MILLIGRAM(S): at 06:16

## 2024-09-13 RX ADMIN — ACETAMINOPHEN 975 MILLIGRAM(S): 325 TABLET ORAL at 20:40

## 2024-09-13 RX ADMIN — ACETAMINOPHEN 975 MILLIGRAM(S): 325 TABLET ORAL at 08:50

## 2024-09-13 RX ADMIN — VITAMIN A, ASCORBIC ACID, VITAMIN D, .ALPHA.-TOCOPHEROL, THIAMINE MONONITRATE, RIBOFLAVIN, NIACIN, PYRIDOXINE HYDROCHLORIDE, FOLIC ACID, CYANOCOBALAMIN, CALCIUM, IRON, MAGNESIUM, ZINC, AND COPPER 1 TABLET(S): 2700; 70; 400; 30; 1.6; 1.8; 18; 2.5; 1; 12; 100; 65; 25; 25; 2 TABLET ORAL at 12:21

## 2024-09-13 RX ADMIN — Medication 600 MILLIGRAM(S): at 12:20

## 2024-09-13 RX ADMIN — Medication 600 MILLIGRAM(S): at 06:59

## 2024-09-13 RX ADMIN — ACETAMINOPHEN 975 MILLIGRAM(S): 325 TABLET ORAL at 08:05

## 2024-09-13 NOTE — PROGRESS NOTE ADULT - SUBJECTIVE AND OBJECTIVE BOX
Patient seen and examined at bedside, resting comfortably in no acute distress. Denies fever, chills, nausea or vomiting. She is tolerating a regular diet. She is ambulating without difficulty and voiding spontaneously. Pain is well controlled. Bleeding is less than a normal menses.    Physical Examination:  Vital Signs: Vital Signs Last 24 Hrs  T(C): 36.2 (13 Sep 2024 06:00), Max: 36.8 (12 Sep 2024 14:24)  T(F): 97.2 (13 Sep 2024 06:00), Max: 98.3 (12 Sep 2024 21:10)  HR: 63 (13 Sep 2024 06:00) (56 - 106)  BP: 119/67 (13 Sep 2024 06:00) (90/52 - 127/93)  BP(mean): --  RR: 17 (13 Sep 2024 06:00) (14 - 17)  SpO2: 100% (13 Sep 2024 06:00) (97% - 100%)      General: alert and oriented, no acute distress  Cardio: regular rate and rhythm  Respiratory: non labored respirations  Abdomen: soft, non-tender, non-distended, uterus firm, palpated below the umbilicus  VE: minimal lochia noted  Musculoskeletal: No erythema/edema, no calf tenderness bilaterally    MEDICATIONS  (STANDING):  acetaminophen     Tablet .. 975 milliGRAM(s) Oral <User Schedule>  diphtheria/tetanus/pertussis (acellular) Vaccine (Adacel) 0.5 milliLiter(s) IntraMuscular once  ibuprofen  Tablet. 600 milliGRAM(s) Oral every 6 hours  influenza   Vaccine 0.5 milliLiter(s) IntraMuscular once  prenatal multivitamin 1 Tablet(s) Oral daily  sodium chloride 0.9% lock flush 3 milliLiter(s) IV Push every 8 hours      Labs:  Blood type: O Positive  Rubella IgG: RPR: Negative                          10.6<L>   13.80<H> >-----------< 309    (  @ 08:00 )             33.4<L>      Assessment and Plan:   30yo s/p  on  now PPD#1 .  Patient is stable and doing well post-partum.   Blood type: O+    Plan:  - VS per unit protocol  - SCDs for DVT ppx  - Regular diet  - Pain well controlled, continue current pain regimen  - Encourage ambulation  - Discharge instructions reviewed  - D/c planning initiated, patient to follow up in office in 6 weeks for post partum visit    Lars Murillo MD

## 2024-09-13 NOTE — PROGRESS NOTE ADULT - ASSESSMENT
INTERVAL HPI/OVERNIGHT EVENTS:  29y Female s/p labor epidural on   Vital Signs Last 24 Hrs  T(C): 36.2 (13 Sep 2024 06:00), Max: 36.8 (12 Sep 2024 21:10)  T(F): 97.2 (13 Sep 2024 06:00), Max: 98.3 (12 Sep 2024 21:10)  HR: 63 (13 Sep 2024 06:00) (62 - 106)  BP: 119/67 (13 Sep 2024 06:00) (97/53 - 127/93)  BP(mean): --  RR: 17 (13 Sep 2024 06:00) (14 - 17)  SpO2: 100% (13 Sep 2024 06:00) (97% - 100%)  Patient seen, doing well, no anesthetic complications or complaints noted or reported.

## 2024-09-13 NOTE — PROGRESS NOTE ADULT - SUBJECTIVE AND OBJECTIVE BOX
S: 30yo  PPD#1 s/p .  QBL:157.  Patient feels well. Pain is well controlled. She is tolerating a regular diet and passing flatus. She is voiding spontaneously, and ambulating without difficulty. Denies CP/SOB. Denies lightheadedness/dizziness. Denies N/V.      O:  Vitals:  Vital Signs Last 24 Hrs  T(C): 36.2 (13 Sep 2024 06:00), Max: 37.1 (12 Sep 2024 10:21)  T(F): 97.2 (13 Sep 2024 06:00), Max: 98.78 (12 Sep 2024 10:21)  HR: 63 (13 Sep 2024 06:00) (56 - 106)  BP: 119/67 (13 Sep 2024 06:00) (90/52 - 127/93)  BP(mean): --  RR: 17 (13 Sep 2024 06:00) (14 - 17)  SpO2: 100% (13 Sep 2024 06:00) (96% - 100%)        MEDICATIONS  (STANDING):  acetaminophen     Tablet .. 975 milliGRAM(s) Oral <User Schedule>  diphtheria/tetanus/pertussis (acellular) Vaccine (Adacel) 0.5 milliLiter(s) IntraMuscular once  ibuprofen  Tablet. 600 milliGRAM(s) Oral every 6 hours  influenza   Vaccine 0.5 milliLiter(s) IntraMuscular once  prenatal multivitamin 1 Tablet(s) Oral daily  sodium chloride 0.9% lock flush 3 milliLiter(s) IV Push every 8 hours      Labs:  Blood type: O Positive  Rubella IgG: RPR: Negative                          10.6<L>   13.80<H> >-----------< 309    ( 12 @ 08:00 )             33.4<L>      Physical Exam:  General: NAD  Abdomen: soft, non-tender, non-distended, fundus firm.  previous  scar intact.  Vaginal: Lochia wnl  Extremities: No calf tenderness    A/P: 30yo PPD#1 s/p .  Patient is stable and doing well post-partum.   - Pain well controlled, continue current pain regimen  - Increase ambulation, SCDs when not ambulating  - Continue regular diet  - Discharge planning    Martha GALLO

## 2024-09-13 NOTE — LACTATION INITIAL EVALUATION - LACTATION INTERVENTIONS
Mom educated about babies less than 24 hours of age will be sleepy. Made aware of cluster feeding that occurs after 24 hours of life and to be cautious of sleep deprivation in order to maintain infant and mother safety. Instructed to place infant in bassinet or call for assistance if feeling sleepy or tired.Recognition of feeding cues and to feed the baby on demand based on cues at least 8-12 times in a day. Instructed pt. to wake the baby to feed if no feeding cues are seen within 3h since prior feed. Pt. educated on the nutritional needs of the baby, how many wet and dirty diapers to expect, along with the amount of times the baby needs to be placed on the breast at this time.  use  feeding log to record feedings along with wet and dirty diapers. instructed in hand expression with good return demonstration.  Reviewed safe skin to skin. Verbalized understanding of education. Encouraged to breastfeed the baby on demand based on cues and at least 8-12 times in a day. Instructed to log feedings along with wet and dirty diapers. Assisted mom with latch and positioning. Encouraged deeper latch. Instructed in hand expression with good+ colostrum noted./initiate/review safe skin-to-skin/initiate/review hand expression/reverse pressure softening/initiate/review techniques for position and latch/review techniques to increase milk supply/review techniques to manage sore nipples/engorgement/initiate/review finger suck/initiate/review breast massage/compression/initiate/review alternate feeding method/reviewed components of an effective feeding and at least 8 effective feedings per day required/reviewed importance of monitoring infant diapers, the breastfeeding log, and minimum output each day/reviewed risks of unnecessary formula supplementation/reviewed risks of artificial nipples/reviewed strategies to transition to breastfeeding only/reviewed benefits and recommendations for rooming in/reviewed feeding on demand/by cue at least 8 times a day/reviewed indications of inadequate milk transfer that would require supplementation

## 2024-09-14 VITALS
OXYGEN SATURATION: 97 % | TEMPERATURE: 98 F | HEART RATE: 90 BPM | RESPIRATION RATE: 16 BRPM | DIASTOLIC BLOOD PRESSURE: 54 MMHG | SYSTOLIC BLOOD PRESSURE: 118 MMHG

## 2024-10-18 ENCOUNTER — APPOINTMENT (OUTPATIENT)
Dept: OBGYN | Facility: CLINIC | Age: 29
End: 2024-10-18
Payer: COMMERCIAL

## 2024-10-18 VITALS
DIASTOLIC BLOOD PRESSURE: 71 MMHG | SYSTOLIC BLOOD PRESSURE: 105 MMHG | HEART RATE: 78 BPM | BODY MASS INDEX: 25.1 KG/M2 | WEIGHT: 147 LBS | HEIGHT: 64 IN

## 2024-10-18 PROCEDURE — 0503F POSTPARTUM CARE VISIT: CPT
